# Patient Record
(demographics unavailable — no encounter records)

---

## 2018-05-03 NOTE — C.PDOC
History Of Present Illness


60 y/o female presents to ED with c/o abdominal pain and diarrhea for 2 weeks 

associated with chills. At ED patient is asymptomatic but is requesting to be 

evaluated for symptoms. Patient denies fever, nausea, vomiting, back pain, 

dysuria or any other complaints at this time. 


Time Seen by Provider: 18 07:54


Chief Complaint (Nursing): Abdominal Pain


History Per: Patient


History/Exam Limitations: no limitations


Onset/Duration Of Symptoms: Days


Current Symptoms Are (Timing): Better


Location Of Pain/Discomfort: Diffuse


Radiation Of Pain To:: None





Past Medical History


Reviewed: Historical Data, Nursing Documentation, Vital Signs


Vital Signs: 


 Last Vital Signs











Temp  98 F   18 11:25


 


Pulse  78   18 11:25


 


Resp  18   18 11:25


 


BP  136/74   18 11:25


 


Pulse Ox  98   18 11:25














- Medical History


PMH: HTN


Surgical History: Cholecystectomy


Family History: States: No Known Family Hx





- Social History


Hx Alcohol Use: No


Hx Substance Use: No





- Immunization History


Hx Tetanus Toxoid Vaccination: No


Hx Influenza Vaccination: No


Hx Pneumococcal Vaccination: No





Review Of Systems


Except As Marked, All Systems Reviewed And Found Negative.


Gastrointestinal: Positive for: Abdominal Pain, Diarrhea





Physical Exam





- Physical Exam


Appears: Non-toxic, No Acute Distress


Skin: Warm, Dry, No Rash


Head: Atraumatic, Normacephalic


Oral Mucosa: Moist


Neck: Normal ROM, Supple


Cardiovascular: Rhythm Regular


Respiratory: Normal Breath Sounds, No Rales, No Rhonchi, No Wheezing


Gastrointestinal/Abdominal: Soft, No Tenderness, No Guarding, No Rebound


Back: No CVA Tenderness


Extremity: Normal ROM, Capillary Refill (<2 seconds)


Neurological/Psych: Oriented x3, Normal Speech, Normal Cognition





ED Course And Treatment





- Laboratory Results


Result Diagrams: 


 18 08:36





 18 08:36


ECG: Interpreted By Me, Viewed By Me


ECG Rhythm: Sinus Rhythm


Rate From EC (BPM)


O2 Sat by Pulse Oximetry: 100 (RA)


Pulse Ox Interpretation: Normal





Medical Decision Making


Medical Decision Making: 


Assessment: Abdominal pain





Although xray reading as reported patient has no pain upon presentation and 

discharged home and advised to return to ER if pain develops. 














Disposition


Counseled Patient/Family Regarding: Studies Performed, Diagnosis, Need For 

Followup





- Disposition


Referrals: 


[facility] [Outside]


Disposition: HOME/ ROUTINE


Disposition Time: 10:03


Condition: STABLE


Additional Instructions: 


follow up with medical clinic in 2 days


call to make an appointment


continue home medications 


return to ER if symptoms worsens or progress 


Instructions:  Diarrhea in Adolescents and Adults, Viral Gastroenteritis


Forms:  Gen Discharge Inst Georgian, [facility] (Georgian)


Print Language: Turkmen





- Clinical Impression


Clinical Impression: 


 Diarrhea, Abdominal pain








- Scribe Statement


The provider has reviewed the documentation as recorded by the Juan Carlos Johnson





All medical record entries made by the Juan Carlos were at my direction and 

personally dictated by me. I have reviewed the chart and agree that the record 

accurately reflects my personal performance of the history, physical exam, 

medical decision making, and the department course for this patient. I have 

also personally directed, reviewed, and agree with the discharge instructions 

and disposition.

## 2018-05-03 NOTE — RAD
PROCEDURE:  Radiographs of the chest and abdomen (obstructive series)



HISTORY:

Abdominal pain



COMPARISON:

No prior.



TECHNIQUE:

AP radiograph of the chest, with upright and supine radiographs of 

the abdomen.



FINDINGS:



CHEST:

Lungs: The lungs are well inflated and clear. There are bilateral 

upper lobe calcified pleural plaques. .



Cardiovascular: Normal size heart. No pulmonary vascular congestion.



Pleura: No pleural fluid. No pneumothorax.



Other findings: None.



ABDOMEN AND PELVIS:

Bowel: There is large amount of stool in the colon.  There are 

differential air-fluid levels and mild cashews distension of small 

bowel loops.



Free air: None.



Bones:  Unremarkable.



Other findings: Surgical clips in the right upper quadrant are 

related to prior cholecystectomy.



IMPRESSION:

1. Mild gaseous distension of small bowel loops and differential 

air-fluid levels in the setting of constipation could represent 

chronic stasis or developing bowel obstruction.  Clinical follow-up 

is advised.



2. Clear lungs.  Calcified upper lobe pleural plaques which may be 

related to prior asbestos exposure.

## 2018-08-22 NOTE — C.PDOC
History Of Present Illness


62 y/o female presents to ED with c/o abdominal pain associated with diarrhea 

for "months". Patient had CT scan on 8/8/18 with negative results and has been 

seen at clinic multiple times for same symptoms. Patient denies fever, chills, 

nausea, vomiting, blood in stool or any other complaints at this time. 


Time Seen by Provider: 08/22/18 08:17


Chief Complaint (Nursing): Abdominal Pain


History Per: Patient


History/Exam Limitations: no limitations


Onset/Duration Of Symptoms: Days


Current Symptoms Are (Timing): Still Present





Past Medical History


Reviewed: Historical Data, Nursing Documentation, Vital Signs


Vital Signs: 


 Last Vital Signs











Temp  98.4 F   08/22/18 11:56


 


Pulse  74   08/22/18 11:56


 


Resp  18   08/22/18 11:56


 


BP  110/69   08/22/18 11:56


 


Pulse Ox  100   08/22/18 11:56














- Medical History


PMH: HTN


Surgical History: Cholecystectomy


Family History: States: No Known Family Hx





- Social History


Hx Alcohol Use: No


Hx Substance Use: No





- Immunization History


Hx Tetanus Toxoid Vaccination: No


Hx Influenza Vaccination: No


Hx Pneumococcal Vaccination: No





Review Of Systems


Constitutional: Negative for: Fever, Chills


Gastrointestinal: Positive for: Abdominal Pain, Diarrhea.  Negative for: Nausea

, Vomiting, Hematochezia


Genitourinary: Negative for: Dysuria


Skin: Negative for: Rash





Physical Exam





- Physical Exam


Appears: Non-toxic, No Acute Distress


Skin: Warm, Dry, No Rash


Head: Atraumatic, Normacephalic


Eye(s): bilateral: Normal Inspection


Oral Mucosa: Moist


Cardiovascular: Rhythm Regular


Respiratory: Normal Breath Sounds, No Rales, No Rhonchi, No Wheezing


Gastrointestinal/Abdominal: Soft, No Tenderness, No Guarding, No Rebound


Extremity: Normal ROM, Capillary Refill (<2 seconds)


Neurological/Psych: Oriented x3, Normal Speech, Normal Cognition





ED Course And Treatment





- Laboratory Results


Result Diagrams: 


 08/22/18 09:42





 08/22/18 09:42


Lab Interpretation: Normal


O2 Sat by Pulse Oximetry: 100 (RA)


Pulse Ox Interpretation: Normal


Progress Note: Blood work, UA, IV Fluids.  On re-valuation abdomen soft nono-

tender. in no distress


Reassessment Condition: Improved





Disposition


Counseled Patient/Family Regarding: Studies Performed, Diagnosis, Need For 

Followup





- Disposition


Referrals: 


[facility] [Outside]


[facility] [Outside]


Disposition: HOSPITALIZED


Disposition Time: 11:40


Condition: STABLE


Additional Instructions: 


Follow up with clinic and GI for further evaluation


Instructions:  Diarrhea and Traveler's Diarrhea, Adult (DC)


Forms:  [employer] (English)


Print Language: Thai





- POA


Present On Arrival: None





- Clinical Impression


Clinical Impression: 


 Diarrhea








- PA / NP / Resident Statement


MD/DO has reviewed & agrees with the documentation as recorded.





- Scribe Statement


The provider has reviewed the documentation as recorded by the Saritaibkimberli Johnson





All medical record entries made by the Juan Carlos were at my direction and 

personally dictated by me. I have reviewed the chart and agree that the record 

accurately reflects my personal performance of the history, physical exam, 

medical decision making, and the department course for this patient. I have 

also personally directed, reviewed, and agree with the discharge instructions 

and disposition.

## 2020-04-22 NOTE — CARD
--------------- APPROVED REPORT --------------





EKG Measurement

Heart [id number]

[id number]

[id number]

[id number]

[id number]



<Conclusion>

Normal sinus rhythm

Normal ECG Patient Requesting a refill.    Medication(s) for Duane C Luick submitted for a refill request and is pending approval from the Provider.    Caller has been advised that their call does not guarantee an immediate refill. This refill will be reviewed within 24-72 hours by a qualified provider who will determine whether he or she can refill the medication.    Patient has contacted the pharmacy?  No    Call Back Number:[phone]    Can a detailed message be left? Yes_No_---: Yes    Additional information: Any questions please call patient     has medication left     Patient’s preferred pharmacy has been noted and populated.       [facility] - [address]  [address]  [facility] [address]  Phone: [phone] Fax: [phone]